# Patient Record
Sex: FEMALE | Race: WHITE | NOT HISPANIC OR LATINO | Employment: FULL TIME | URBAN - METROPOLITAN AREA
[De-identification: names, ages, dates, MRNs, and addresses within clinical notes are randomized per-mention and may not be internally consistent; named-entity substitution may affect disease eponyms.]

---

## 2017-03-29 ENCOUNTER — LAB VISIT (OUTPATIENT)
Dept: LAB | Facility: HOSPITAL | Age: 27
End: 2017-03-29
Attending: NURSE PRACTITIONER
Payer: COMMERCIAL

## 2017-03-29 ENCOUNTER — OFFICE VISIT (OUTPATIENT)
Dept: FAMILY MEDICINE | Facility: CLINIC | Age: 27
End: 2017-03-29
Payer: COMMERCIAL

## 2017-03-29 VITALS
DIASTOLIC BLOOD PRESSURE: 78 MMHG | SYSTOLIC BLOOD PRESSURE: 124 MMHG | BODY MASS INDEX: 34.51 KG/M2 | WEIGHT: 233 LBS | OXYGEN SATURATION: 99 % | HEART RATE: 85 BPM | HEIGHT: 69 IN

## 2017-03-29 DIAGNOSIS — R21 RASH: Primary | ICD-10-CM

## 2017-03-29 DIAGNOSIS — R21 RASH: ICD-10-CM

## 2017-03-29 LAB
BASOPHILS # BLD AUTO: 0.04 K/UL
BASOPHILS NFR BLD: 0.3 %
DIFFERENTIAL METHOD: NORMAL
EOSINOPHIL # BLD AUTO: 0.4 K/UL
EOSINOPHIL NFR BLD: 3.2 %
ERYTHROCYTE [DISTWIDTH] IN BLOOD BY AUTOMATED COUNT: 13.4 %
ERYTHROCYTE [SEDIMENTATION RATE] IN BLOOD BY WESTERGREN METHOD: 15 MM/HR
HCT VFR BLD AUTO: 40.5 %
HGB BLD-MCNC: 13.6 G/DL
LYMPHOCYTES # BLD AUTO: 3.5 K/UL
LYMPHOCYTES NFR BLD: 30.3 %
MCH RBC QN AUTO: 28.6 PG
MCHC RBC AUTO-ENTMCNC: 33.6 %
MCV RBC AUTO: 85 FL
MONOCYTES # BLD AUTO: 0.6 K/UL
MONOCYTES NFR BLD: 5.2 %
NEUTROPHILS # BLD AUTO: 7 K/UL
NEUTROPHILS NFR BLD: 60.8 %
PLATELET # BLD AUTO: 331 K/UL
PMV BLD AUTO: 11.2 FL
RBC # BLD AUTO: 4.76 M/UL
WBC # BLD AUTO: 11.52 K/UL

## 2017-03-29 PROCEDURE — 99999 PR PBB SHADOW E&M-NEW PATIENT-LVL III: CPT | Mod: PBBFAC,,, | Performed by: NURSE PRACTITIONER

## 2017-03-29 PROCEDURE — 36415 COLL VENOUS BLD VENIPUNCTURE: CPT | Mod: PO

## 2017-03-29 PROCEDURE — 1160F RVW MEDS BY RX/DR IN RCRD: CPT | Mod: S$GLB,,, | Performed by: NURSE PRACTITIONER

## 2017-03-29 PROCEDURE — 85025 COMPLETE CBC W/AUTO DIFF WBC: CPT

## 2017-03-29 PROCEDURE — 99203 OFFICE O/P NEW LOW 30 MIN: CPT | Mod: S$GLB,,, | Performed by: NURSE PRACTITIONER

## 2017-03-29 PROCEDURE — 85651 RBC SED RATE NONAUTOMATED: CPT | Mod: PO

## 2017-03-29 PROCEDURE — 84443 ASSAY THYROID STIM HORMONE: CPT

## 2017-03-29 PROCEDURE — 86038 ANTINUCLEAR ANTIBODIES: CPT

## 2017-03-29 PROCEDURE — 80053 COMPREHEN METABOLIC PANEL: CPT

## 2017-03-29 RX ORDER — CETIRIZINE HYDROCHLORIDE 10 MG/1
10 TABLET ORAL 2 TIMES DAILY
Qty: 20 TABLET | Refills: 0 | Status: SHIPPED | OUTPATIENT
Start: 2017-03-29 | End: 2018-09-27

## 2017-03-29 RX ORDER — METHYLPREDNISOLONE 4 MG/1
TABLET ORAL
Qty: 1 PACKAGE | Refills: 0 | Status: SHIPPED | OUTPATIENT
Start: 2017-03-29 | End: 2017-04-19

## 2017-03-29 NOTE — PROGRESS NOTES
Subjective:       Patient ID: Alka Mckeon is a 26 y.o. female.    Chief Complaint: Rash    HPI Comments: Skin on face and left hand was tinging on Friday, then Saturday noticed redness in the shonda that were tingling, Sunday rash got worse took benadryl and used benadryl ointment on the rash, Monday geto worse and tried to go to a doctor but couldn't be seen, yesterday the rash spread to stomach and right arm, went to ER at [facility] last night, she was not given anything and advised to see a dermatologist, no testing done. Itching, red, inflamed. Heat makes it much worse. No unusual activities on Thursday or Friday, did not change detergent, soap, lotion, or make up, did not eat anything unusual, no new pets in the house, has not been in yard or woods. No chemical exposure that she knows of. No improvement with benadryl cream or oral benadryl. Patient says she had a similar rash about 1.5 years ago, did not improve with a steroid shot or ointment, lasted a month and then resolved.  Lipid Panel due on 1989  TETANUS VACCINE due on 08/24/2007  Pap Smear due on 08/24/2010  Influenza Vaccine due on 08/01/2016    Past Medical History:  No past medical history on file.  No past surgical history on file.  Review of patient's allergies indicates:  No Known Allergies  No current outpatient prescriptions on file prior to visit.  No current facility-administered medications on file prior to visit.     Social History    Marital status:              Spouse name:                       Years of education:                 Number of children:               Occupational History    None on file    Social History Main Topics    Smoking status: Never Smoker                                                                   Smokeless status: Not on file                       Alcohol use: Not on file     Drug use: Not on file     Sexual activity: Not on file          Other Topics            Concern    None on  file    Social History Narrative    None on file      No family history on file.          Review of Systems   Constitutional: Negative.  Negative for chills, diaphoresis, fatigue and fever.   HENT: Negative.  Negative for facial swelling, hearing loss, mouth sores, nosebleeds, postnasal drip, rhinorrhea, sinus pressure, sneezing, sore throat and trouble swallowing.    Respiratory: Negative.  Negative for cough, shortness of breath and wheezing.    Cardiovascular: Negative.  Negative for chest pain, palpitations and leg swelling.   Gastrointestinal: Negative.  Negative for abdominal pain, constipation, diarrhea, nausea and vomiting.   Genitourinary: Negative.    Musculoskeletal: Negative.  Negative for arthralgias.   Skin: Positive for rash.   Neurological: Negative.  Negative for dizziness, light-headedness and headaches.       Objective:      Physical Exam   Constitutional: She is oriented to person, place, and time. No distress.   HENT:   Head: Normocephalic and atraumatic.   Cardiovascular: Normal rate.    Pulmonary/Chest: Effort normal.   Neurological: She is alert and oriented to person, place, and time.   Skin: Rash noted. She is not diaphoretic. No erythema. No pallor.        Psychiatric: She has a normal mood and affect. Her behavior is normal.       Assessment:       1. Rash        Plan:       1. Rash  Labs today, treat with medrol, zantac and zyrtec. Dermatology scheduled next week to evaluate.   - CBC auto differential; Future  - TSH; Future  - KRYS; Future  - Comprehensive metabolic panel; Future  - methylPREDNISolone (MEDROL DOSEPACK) 4 mg tablet; use as directed  Dispense: 1 Package; Refill: 0  - ranitidine (ZANTAC) 150 MG tablet; Take 1 tablet (150 mg total) by mouth 2 (two) times daily.  Dispense: 20 tablet; Refill: 0  - cetirizine (ZYRTEC) 10 MG tablet; Take 1 tablet (10 mg total) by mouth 2 (two) times daily.  Dispense: 20 tablet; Refill: 0  - Sedimentation rate, manual; Future,ed

## 2017-03-29 NOTE — MR AVS SNAPSHOT
[facility]  [address]  [facility] [address]  Phone: [phone]  Fax: [phone]                  Alka Mckeon   3/29/2017 2:40 PM   Office Visit    Description:  Female : 1990   Provider:  Esther Harrell NP   Department:  [facility]           Reason for Visit     Rash           Diagnoses this Visit        Comments    Rash    -  Primary            To Do List           Future Appointments        Provider Department Dept Phone    3/29/2017 4:45 PM LAB, [place] [facility] [phone]      Goals (5 Years of Data)     None       These Medications        Disp Refills Start End    methylPREDNISolone (MEDROL DOSEPACK) 4 mg tablet 1 Package 0 3/29/2017 2017    use as directed    Pharmacy: [facility] [address] [place] [address] [facility] AT [facility] [address] [place] [address] Ph #: [phone]       ranitidine (ZANTAC) 150 MG tablet 20 tablet 0 3/29/2017 3/29/2018    Take 1 tablet (150 mg total) by mouth 2 (two) times daily. - Oral    Pharmacy: [facility] [address] [place] [address] [facility] AT [facility] [address] [facility] [address] Ph #: [phone]       cetirizine (ZYRTEC) 10 MG tablet 20 tablet 0 3/29/2017 3/29/2018    Take 1 tablet (10 mg total) by mouth 2 (two) times daily. - Oral    Pharmacy: [facility] [address] [place] [address] [facility] AT [facility] [address] [place] [address] Ph #: [phone]         [facility] On Call     [facility] On Call Nurse Care Line -  Assistance  Registered nurses in the [facility] provide clinical advisement, health education, appointment booking, and other advisory services.  Call for this free service at [phone].             Medications           Message regarding Medications     Verify the changes and/or additions to your medication regime listed below are the same as discussed with your clinician today.  If any of these changes or additions are  "incorrect, please notify your healthcare provider.        START taking these NEW medications        Refills    methylPREDNISolone (MEDROL DOSEPACK) 4 mg tablet 0    Sig: use as directed    Class: Normal    ranitidine (ZANTAC) 150 MG tablet 0    Sig: Take 1 tablet (150 mg total) by mouth 2 (two) times daily.    Class: Normal    Route: Oral    cetirizine (ZYRTEC) 10 MG tablet 0    Sig: Take 1 tablet (10 mg total) by mouth 2 (two) times daily.    Class: Normal    Route: Oral           Verify that the below list of medications is an accurate representation of the medications you are currently taking.  If none reported, the list may be blank. If incorrect, please contact your healthcare provider. Carry this list with you in case of emergency.           Current Medications     PRENATAL VIT COMB.10-IRON-FA ORAL Take by mouth.    cetirizine (ZYRTEC) 10 MG tablet Take 1 tablet (10 mg total) by mouth 2 (two) times daily.    methylPREDNISolone (MEDROL DOSEPACK) 4 mg tablet use as directed    ranitidine (ZANTAC) 150 MG tablet Take 1 tablet (150 mg total) by mouth 2 (two) times daily.           Clinical Reference Information           Your Vitals Were     BP Pulse Height Weight Last Period SpO2    124/78 85 5' 9" (1.753 m) 105.7 kg (233 lb 0.4 oz) 03/08/2017 (Approximate) 99%    BMI                34.41 kg/m2          Blood Pressure          Most Recent Value    BP  124/78      Allergies as of 3/29/2017     No Known Allergies      Immunizations Administered on Date of Encounter - 3/29/2017     None      Orders Placed During Today's Visit     Future Labs/Procedures Expected by Expires    KRYS  3/29/2017 5/28/2018    CBC auto differential  3/29/2017 5/28/2018    Comprehensive metabolic panel  3/29/2017 5/28/2018    Sedimentation rate, manual  3/29/2017 5/28/2018    TSH  3/29/2017 5/28/2018      [facility] Sign-Up     Activating your [facility] account is as easy as 1-2-3!     1) Visit [email or web address], select Sign Up Now, enter this " activation code and your date of birth, then select Next.  [email or web address]  Expires: 5/13/2017  3:44 PM      2) Create a username and password to use when you visit [facility] in the future and select a security question in case you lose your password and select Next.    3) Enter your e-mail address and click Sign Up!    Additional Information  If you have questions, please e-mail [email or web address] or call [phone] to talk to our [employer] staff. Remember, [employer] is NOT to be used for urgent needs. For medical emergencies, dial 911.         Language Assistance Services     ATTENTION: Language assistance services are available, free of charge. Please call [phone].      ATENCIÓN: Si habla colton, tiene a ware disposición servicios gratuitos de asistencia lingüística. Llame al [phone].     CHÚ Ý: N?u b?n nói Ti?ng Vi?t, có các d?ch v? h? tr? ngôn ng? mi?n phí dành cho b?n. G?i s? [phone].         [facility] complies with applicable Federal civil rights laws and does not discriminate on the basis of race, color, national origin, age, disability, or sex.

## 2017-03-30 LAB
ALBUMIN SERPL BCP-MCNC: 3.8 G/DL
ALP SERPL-CCNC: 76 U/L
ALT SERPL W/O P-5'-P-CCNC: 61 U/L
ANA SER QL IF: NORMAL
ANION GAP SERPL CALC-SCNC: 9 MMOL/L
AST SERPL-CCNC: 40 U/L
BILIRUB SERPL-MCNC: 0.3 MG/DL
BUN SERPL-MCNC: 9 MG/DL
CALCIUM SERPL-MCNC: 9.3 MG/DL
CHLORIDE SERPL-SCNC: 104 MMOL/L
CO2 SERPL-SCNC: 28 MMOL/L
CREAT SERPL-MCNC: 0.8 MG/DL
EST. GFR  (AFRICAN AMERICAN): >60 ML/MIN/1.73 M^2
EST. GFR  (NON AFRICAN AMERICAN): >60 ML/MIN/1.73 M^2
GLUCOSE SERPL-MCNC: 100 MG/DL
POTASSIUM SERPL-SCNC: 4.4 MMOL/L
PROT SERPL-MCNC: 7.2 G/DL
SODIUM SERPL-SCNC: 141 MMOL/L
TSH SERPL DL<=0.005 MIU/L-ACNC: 2.41 UIU/ML

## 2017-03-31 ENCOUNTER — TELEPHONE (OUTPATIENT)
Dept: FAMILY MEDICINE | Facility: CLINIC | Age: 27
End: 2017-03-31

## 2017-03-31 DIAGNOSIS — R74.01 ELEVATED ALT MEASUREMENT: Primary | ICD-10-CM

## 2017-03-31 NOTE — TELEPHONE ENCOUNTER
----- Message from Esther Harrell NP sent at 3/31/2017  9:56 AM CDT -----  KRYS negative. Thyroid normal. CMP- one liver enzyme mildly elevated, otherwise normal, recheck liver panel in 2-3 weeks. CBC normal. Sed rate normal.

## 2017-03-31 NOTE — TELEPHONE ENCOUNTER
Phoned pt in regards to lab results per Esther Harrell's instructions. Pt voiced understanding and upon attempting to schedule repeat liver function panel pt states she will call back to schedule. CLC

## 2018-09-27 ENCOUNTER — OFFICE VISIT (OUTPATIENT)
Dept: FAMILY MEDICINE | Facility: CLINIC | Age: 28
End: 2018-09-27
Payer: COMMERCIAL

## 2018-09-27 VITALS
TEMPERATURE: 98 F | OXYGEN SATURATION: 98 % | HEIGHT: 69 IN | HEART RATE: 78 BPM | BODY MASS INDEX: 33.86 KG/M2 | RESPIRATION RATE: 20 BRPM | DIASTOLIC BLOOD PRESSURE: 80 MMHG | WEIGHT: 228.63 LBS | SYSTOLIC BLOOD PRESSURE: 116 MMHG

## 2018-09-27 DIAGNOSIS — H65.01 ACUTE SEROUS OTITIS MEDIA WITHOUT RUPTURE, RIGHT: Primary | ICD-10-CM

## 2018-09-27 PROCEDURE — 99214 OFFICE O/P EST MOD 30 MIN: CPT | Mod: S$GLB,,, | Performed by: PHYSICIAN ASSISTANT

## 2018-09-27 PROCEDURE — 3008F BODY MASS INDEX DOCD: CPT | Mod: CPTII,S$GLB,, | Performed by: PHYSICIAN ASSISTANT

## 2018-09-27 PROCEDURE — 99999 PR PBB SHADOW E&M-EST. PATIENT-LVL IV: CPT | Mod: PBBFAC,,, | Performed by: PHYSICIAN ASSISTANT

## 2018-09-27 RX ORDER — PREDNISONE 20 MG/1
40 TABLET ORAL DAILY
Qty: 10 TABLET | Refills: 0 | Status: SHIPPED | OUTPATIENT
Start: 2018-09-27 | End: 2018-10-02

## 2018-09-27 RX ORDER — MINERAL OIL
180 ENEMA (ML) RECTAL DAILY
COMMUNITY
End: 2024-01-05 | Stop reason: CLARIF

## 2018-09-27 NOTE — LETTER
September 27, 2018      [facility]  [address] [place] [address]  Phone: [phone]  Fax: [phone]       Patient: Alka Mckeon   YOB: 1990  Date of Visit: 09/27/2018    To Whom It May Concern:    Alison Mckeon  was at [facility] on 09/27/2018. She may return to work on 9/29/2018 with no restrictions. If you have any questions or concerns, or if I can be of further assistance, please do not hesitate to contact me.    Sincerely,    Aster Cates PA-C

## 2018-09-27 NOTE — PROGRESS NOTES
Subjective:       Patient ID: Alka Mckeon is a 28 y.o. female    Chief Complaint: Cough    HPI  Mrs Mckeon presents today CO sinus congestion, cough and ear pressure that began 2 days ago.   Her  got sick yesterday with similar symptoms.  She has started taking allegra without any relief if her symptoms.     Review of Systems   Constitutional: Negative for chills and fever.   HENT: Positive for congestion, ear pain and postnasal drip.    Eyes: Negative for visual disturbance.   Respiratory: Positive for cough.    Cardiovascular: Negative for chest pain.   Gastrointestinal: Negative for abdominal pain, diarrhea, nausea and vomiting.   Skin: Negative for rash.   Neurological: Negative for headaches.        Objective:   Physical Exam   Constitutional: She is oriented to person, place, and time. She appears well-developed and well-nourished.   HENT:   Head: Normocephalic and atraumatic.   Nose: Nose normal.   Mouth/Throat: No oropharyngeal exudate.   Right TM shows clear effusion   Eyes: Conjunctivae and EOM are normal. Pupils are equal, round, and reactive to light.   Neck: Normal range of motion. Neck supple. No thyromegaly present.   Cardiovascular: Normal rate, regular rhythm, normal heart sounds and intact distal pulses. Exam reveals no gallop and no friction rub.   No murmur heard.  Pulmonary/Chest: Effort normal and breath sounds normal. No respiratory distress. She has no wheezes. She has no rales.   Musculoskeletal: Normal range of motion. She exhibits no edema.   Lymphadenopathy:     She has no cervical adenopathy.   Neurological: She is alert and oriented to person, place, and time. She has normal reflexes.   Skin: Skin is warm and dry. No rash noted.   Psychiatric: She has a normal mood and affect. Her behavior is normal. Judgment and thought content normal.        Assessment:       1. Acute serous otitis media without rupture, right  predniSONE (DELTASONE) 20 MG tablet        Plan:        Acute serous otitis media without rupture, right  -     predniSONE (DELTASONE) 20 MG tablet; Take 2 tablets (40 mg total) by mouth once daily. for 5 days  Dispense: 10 tablet; Refill: 0

## 2020-06-09 ENCOUNTER — OFFICE VISIT (OUTPATIENT)
Dept: FAMILY MEDICINE | Facility: CLINIC | Age: 30
End: 2020-06-09
Payer: COMMERCIAL

## 2020-06-09 VITALS
HEART RATE: 97 BPM | TEMPERATURE: 98 F | BODY MASS INDEX: 35.07 KG/M2 | HEIGHT: 69 IN | OXYGEN SATURATION: 95 % | DIASTOLIC BLOOD PRESSURE: 82 MMHG | SYSTOLIC BLOOD PRESSURE: 120 MMHG | WEIGHT: 236.75 LBS

## 2020-06-09 DIAGNOSIS — Z3A.01 LESS THAN 8 WEEKS GESTATION OF PREGNANCY: ICD-10-CM

## 2020-06-09 DIAGNOSIS — L25.9 CONTACT DERMATITIS, UNSPECIFIED CONTACT DERMATITIS TYPE, UNSPECIFIED TRIGGER: Primary | ICD-10-CM

## 2020-06-09 PROCEDURE — 99214 PR OFFICE/OUTPT VISIT, EST, LEVL IV, 30-39 MIN: ICD-10-PCS | Mod: S$GLB,,, | Performed by: NURSE PRACTITIONER

## 2020-06-09 PROCEDURE — 99999 PR PBB SHADOW E&M-EST. PATIENT-LVL IV: CPT | Mod: PBBFAC,,, | Performed by: NURSE PRACTITIONER

## 2020-06-09 PROCEDURE — 3008F BODY MASS INDEX DOCD: CPT | Mod: CPTII,S$GLB,, | Performed by: NURSE PRACTITIONER

## 2020-06-09 PROCEDURE — 3008F PR BODY MASS INDEX (BMI) DOCUMENTED: ICD-10-PCS | Mod: CPTII,S$GLB,, | Performed by: NURSE PRACTITIONER

## 2020-06-09 PROCEDURE — 99214 OFFICE O/P EST MOD 30 MIN: CPT | Mod: S$GLB,,, | Performed by: NURSE PRACTITIONER

## 2020-06-09 PROCEDURE — 99999 PR PBB SHADOW E&M-EST. PATIENT-LVL IV: ICD-10-PCS | Mod: PBBFAC,,, | Performed by: NURSE PRACTITIONER

## 2020-06-09 NOTE — PATIENT INSTRUCTIONS
"  Contact Dermatitis  Contact dermatitis is a skin rash caused by something that touches the skin and makes it irritated and inflamed. Your skin may be red, swollen, dry, and may be cracked. Blisters may form and ooze. The rash will itch.  Contact dermatitis can form on the face and neck, backs of hands, forearms, genitals, and lower legs.  People can get contact dermatitis from lots of sources. These include:  · Plants such as poison ivy, oak, or sumac  · Chemicals in hair dyes and rinses, soaps, solvents, waxes, fingernail polish, and deodorants   · Jewelry or watchbands made of nickel  Contact dermatitis is not passed from person to person.  Talk with your healthcare provider about what may have caused the rash. A type of allergy testing called "patch testing" may be used to discover what you are allergic to. You will need to avoid the source of your rash in the future to prevent it from coming back.  Treatment is done to relieve itching and prevent the rash from coming back. The rash should go away in a few days to a few weeks.  Home care  Your healthcare provider may prescribe medicine to relieve swelling and itching. Follow all instructions when using these medicines.  General care:  · Avoid anything that heats up your skin, such as hot showers or baths, or direct sunlight. This can make itching worse.  · Apply cold compresses to soothe your sores to help relieve your symptoms. Do this for 30 minutes 3 to 4 times a day. You can make a cold compress by soaking a cloth in cold water. Squeeze out excess water. You can add colloidal oatmeal to the water to help reduce itching. For severe itching in a small area, apply an ice pack wrapped in a thin towel. Do this for 20 minutes 3 to 4 times a day.  · You can also try wet dressings. One way to do this is to wear a wet piece of clothing under a dry one. Wear a damp shirt under a dry shirt if your upper body is affected. This can relieve itching and prevent you from " scratching the affected area.  · You can also help relieve large areas of itching by taking a lukewarm bath with colloidal oatmeal added to the water.  · Use hydrocortisone cream for redness and irritation, unless another medicine was prescribed. You can also use benzocaine anesthetic cream or spray. Calamine lotion can also relieve mild symptoms.  · Use oral diphenhydramine to help reduce itching. You can buy this antihistamine at drug and grocery stores. It can make you sleepy, so use lower doses during the daytime. Or you can use loratadine. This is an antihistamine that will not make you sleepy. Do not use diphenhydramine if you have glaucoma or have trouble urinating due to an enlarged prostate.  · If a plant causes your rash, make sure to wash your skin and the clothes you were wearing when you came into contact with the plant. This is to wash away the plant oils that gave you the rash and prevent more or worse symptoms.  · Stay away from the substance or object that causes your symptoms. If you cant avoid it, wear gloves or some other type of protection.  Follow-up care  Follow up with your healthcare provider, or as advised.  When to seek medical advice  Call your healthcare provider right away if any of these occur:  · Spreading of the rash to other parts of your body  · Severe swelling of your face, eyelids, mouth, throat or tongue  · Trouble urinating due to swelling in the genital area  · Fever of 100.4°F (38°C) or higher  · Redness or swelling that gets worse  · Pain that gets worse  · Foul-smelling fluid leaking from the skin  · Yellow-brown crusts on the open blisters  Date Last Reviewed: 9/1/2016  © 3533-9780 [employer]. [address] [place] [address], [place] [address]. All rights reserved. This information is not intended as a substitute for professional medical care. Always follow your healthcare professional's instructions.

## 2020-06-09 NOTE — PROGRESS NOTES
Subjective:       Patient ID: Alka Mckeon is a 29 y.o. female.    Chief Complaint: Rash (abd/breast...noted about 1 week ago)    Patient who is new to me presents for rash. She was putting on diane butter and noticed a rash. She is 7 weeks pregnant.     Rash   This is a new problem. The current episode started in the past 7 days. The problem is unchanged. The affected locations include the abdomen. The rash is characterized by redness, swelling and itchiness. She was exposed to a new detergent/soap. Pertinent negatives include no congestion, cough, diarrhea, fatigue, fever, shortness of breath, sore throat or vomiting. Past treatments include anti-itch cream. The treatment provided mild relief.     Review of Systems   Constitutional: Negative for chills, fatigue and fever.   HENT: Negative for congestion, sinus pressure, sinus pain, sneezing and sore throat.    Respiratory: Negative for cough, chest tightness, shortness of breath and wheezing.    Cardiovascular: Negative for chest pain, palpitations and leg swelling.   Gastrointestinal: Negative for abdominal distention, abdominal pain, constipation, diarrhea, nausea and vomiting.   Genitourinary: Negative for decreased urine volume, difficulty urinating, dysuria, frequency and urgency.   Musculoskeletal: Negative for arthralgias, gait problem, joint swelling and myalgias.   Skin: Positive for rash. Negative for wound.   Neurological: Negative for dizziness, light-headedness, numbness and headaches.       Objective:      Physical Exam   Constitutional: She is oriented to person, place, and time. She appears well-developed and well-nourished.   HENT:   Head: Normocephalic and atraumatic.   Right Ear: External ear normal.   Left Ear: External ear normal.   Nose: Nose normal.   Mouth/Throat: Oropharynx is clear and moist.   Eyes: Pupils are equal, round, and reactive to light.   Neck: Normal range of motion.   Cardiovascular: Normal rate, regular rhythm, normal  heart sounds and intact distal pulses.   Pulmonary/Chest: Effort normal and breath sounds normal.   Abdominal: Soft. Bowel sounds are normal.   Musculoskeletal: Normal range of motion.   Neurological: She is alert and oriented to person, place, and time.   Skin: Skin is warm and dry. Rash noted.        Nursing note and vitals reviewed.          Assessment:       1. Contact dermatitis, unspecified contact dermatitis type, unspecified trigger    2. Less than 8 weeks gestation of pregnancy        Plan:       Alka was seen today for rash.    Diagnoses and all orders for this visit:    Contact dermatitis, unspecified contact dermatitis type, unspecified trigger    Less than 8 weeks gestation of pregnancy      Advised benadryl, hydrocortisone cream, and calamine lotion. Notify GYN before using any of the recommended medications. Discussed worsening signs/symptoms and when to return to clinic or go to ED.   Patient expresses understanding and agrees with treatment plan.

## 2024-01-05 ENCOUNTER — TELEPHONE (OUTPATIENT)
Dept: NEUROLOGY | Facility: CLINIC | Age: 34
End: 2024-01-05
Payer: COMMERCIAL

## 2024-01-05 NOTE — TELEPHONE ENCOUNTER
Spoke to patient and let her know that none of the providers see memory patients under 45 and without a referral . Told the patient we could see her for tingling and numbness but she would need a referral from her primary care. Patient did not want to go to primary care and asked if anyone else would see her .so  I recommended [facility] in [place].

## 2025-03-13 ENCOUNTER — OFFICE VISIT (OUTPATIENT)
Dept: FAMILY MEDICINE | Facility: CLINIC | Age: 35
End: 2025-03-13
Payer: COMMERCIAL

## 2025-03-13 VITALS
BODY MASS INDEX: 35.98 KG/M2 | TEMPERATURE: 99 F | OXYGEN SATURATION: 98 % | SYSTOLIC BLOOD PRESSURE: 130 MMHG | DIASTOLIC BLOOD PRESSURE: 76 MMHG | HEART RATE: 91 BPM | WEIGHT: 242.94 LBS | HEIGHT: 69 IN

## 2025-03-13 DIAGNOSIS — Z76.89 ENCOUNTER TO ESTABLISH CARE: ICD-10-CM

## 2025-03-13 DIAGNOSIS — Z13.1 DIABETES MELLITUS SCREENING: ICD-10-CM

## 2025-03-13 DIAGNOSIS — Z13.220 LIPID SCREENING: ICD-10-CM

## 2025-03-13 DIAGNOSIS — Z86.39 HISTORY OF THYROID DISEASE: ICD-10-CM

## 2025-03-13 DIAGNOSIS — N64.4 ACUTE BREAST PAIN: ICD-10-CM

## 2025-03-13 DIAGNOSIS — Z00.00 ANNUAL PHYSICAL EXAM: Primary | ICD-10-CM

## 2025-03-13 PROBLEM — Z90.49 HISTORY OF LAPAROSCOPIC CHOLECYSTECTOMY: Status: ACTIVE | Noted: 2025-03-13

## 2025-03-13 PROCEDURE — 99999 PR PBB SHADOW E&M-EST. PATIENT-LVL III: CPT | Mod: PBBFAC,,, | Performed by: STUDENT IN AN ORGANIZED HEALTH CARE EDUCATION/TRAINING PROGRAM

## 2025-03-13 NOTE — PROGRESS NOTES
"Chief Complaint   Patient presents with    Establish Care       Subjective   Patient ID: Alka Mckeon is a 34 y.o. female.    HPI  Akla Mckeon is a 34 y.o. with a PMHx hypothyroidism (not on medication) who presents today to establish care with provider and annual physical   The patients reports she has never had a PCP and was only seen by her OBGYN    Concerns today:   1) she reports that she was dx with hypothyroidism years ago but never started medication for it  2)bilateral breast pain which started a couple of months ago but has returned 2-3 days ago  -when occurred a [place] of months ago it lasted a week but at that time she had a "chest infection and was coughing"  -acne pain which is 5-6, which is constant, its her whole breast that is tender not just her nipples     Current medication list: none  Allergies: NKDA   PSHx: cholecystectomy   PFHx: mother - hypertension, diabetes; father - hypertension; MGM - diabetes; PGF - colon cancer     Social  Tobacco use: reports no use or hx  Alcohol use: reports no use  Illicit drug use: reports no use   Lives with:  and 2 sons  Employment/Work: caregiver  Any children: 2    Lifestyle  Current exercise regimen: no structured routine   Current diet: micture but mostly at home     OB/GYN Hx   LMP: 2024  GsPs:   Contraception: IUD (Jennifer)   Cervical cancer screening - Last PAP: 10/20/2023 (NSIL and HPV negative)  Breast cancer screening - Last mammogram: never had one, fam hx: denies    Screening Hx:  Dental care: 2025  Annual eye exam: 8 mo ago    IPV screen: she reports feeling safe in her relationship   PHQ9 screen:       3/13/2025   PHQ-9 Depression Patient Health Questionnaire   Over the last two weeks how often have you been bothered by little interest or pleasure in doing things 0   Over the last two weeks how often have you been bothered by feeling down, depressed or hopeless 0        Colorectal cancer screening - Last colonoscopy: " "NA  - Starting age 45 in high risk populations. pt average risk  Lung cancer screening - LDCT: NA   - Risk factors: age 50 to 80 who have 20-pack year smoking hx and currently smoke or have quit within the last 15 years  DEXA - Last scan: NA   - A 65, or younger if risk factors. Use FRAX or SCORE  AAA screening - Last US:NA    - Male age 65-75 with hx of smoking or family hx      Review of Systems  Objective      Body mass index is 35.88 kg/m².  Vitals:    03/13/25 0912   BP: 130/76   Pulse: 91   Temp: 98.9 °F (37.2 °C)   TempSrc: Oral   SpO2: 98%   Weight: 110.2 kg (242 lb 15.2 oz)   Height: 5' 9" (1.753 m)          Physical Exam  Vitals reviewed. Exam conducted with a chaperone present.   Constitutional:       General: She is not in acute distress.     Appearance: Normal appearance. She is not ill-appearing.   HENT:      Head: Normocephalic and atraumatic.      Right Ear: Tympanic membrane, ear canal and external ear normal. There is no impacted cerumen.      Left Ear: Tympanic membrane, ear canal and external ear normal. There is no impacted cerumen.      Mouth/Throat:      Mouth: Mucous membranes are moist.      Pharynx: No oropharyngeal exudate.   Eyes:      Conjunctiva/sclera: Conjunctivae normal.   Cardiovascular:      Rate and Rhythm: Normal rate and regular rhythm.      Pulses: Normal pulses.   Pulmonary:      Effort: Pulmonary effort is normal. No respiratory distress.      Breath sounds: Normal breath sounds. No wheezing.   Chest:   Breasts:     Right: Tenderness (upper outer quadarant) present. No inverted nipple or nipple discharge.      Left: Tenderness (lower outer quadarant) present. No swelling, inverted nipple or nipple discharge.   Abdominal:      General: There is no distension.      Palpations: Abdomen is soft.      Tenderness: There is no abdominal tenderness.   Musculoskeletal:      Cervical back: Neck supple.      Right lower leg: No edema.      Left lower leg: No edema.   Lymphadenopathy:    "   Upper Body:      Right upper body: No supraclavicular, axillary or pectoral adenopathy.      Left upper body: No supraclavicular, axillary or pectoral adenopathy.   Neurological:      Mental Status: She is alert and oriented to person, place, and time.         Procedures         Assessment & Plan   Annual physical exam  - Patient is hemodynamically stable with no acute findings on physical exam.  -     CBC Without Differential; Future; Expected date: 03/13/2025  -     Basic Metabolic Panel; Future; Expected date: 03/13/2025    Encounter to establish care  The patient reports that she has never had a PCP as an adult - only sees OBGYN    Acute breast pain  - She reports bilateral breast pain for 3 days - she had previous episode in 12/2024 which lasted for 1wk  -  On exam pain in right upper outer and left lower outer quadrants with palpation  - Will order PT US   -     US Breast Bilateral Limited; Future; Expected date: 03/13/2025    Lipid screening  -     Hemoglobin A1C; Future; Expected date: 03/13/2025    Diabetes mellitus screening  -     Lipid Panel; Future; Expected date: 03/13/2025    History of thyroid disease  - The patient reports hypothyroidism for which she is not on medication (she has never been on medication). Per chart review it appears she had subclinical hypothyroidism with no evidence of every having abnormal T4/T3 results from 2009.  -     TSH; Future; Expected date: 03/13/2025      Patient informed of transferring medical history from prior provider to [facility].

## 2025-03-20 ENCOUNTER — OFFICE VISIT (OUTPATIENT)
Dept: OTOLARYNGOLOGY | Facility: CLINIC | Age: 35
End: 2025-03-20
Payer: COMMERCIAL

## 2025-03-20 ENCOUNTER — LAB VISIT (OUTPATIENT)
Dept: LAB | Facility: HOSPITAL | Age: 35
End: 2025-03-20
Attending: STUDENT IN AN ORGANIZED HEALTH CARE EDUCATION/TRAINING PROGRAM
Payer: COMMERCIAL

## 2025-03-20 VITALS — BODY MASS INDEX: 36.15 KG/M2 | HEIGHT: 69 IN | WEIGHT: 244.06 LBS

## 2025-03-20 DIAGNOSIS — Z86.39 HISTORY OF THYROID DISEASE: ICD-10-CM

## 2025-03-20 DIAGNOSIS — J35.01 CHRONIC TONSILLITIS: ICD-10-CM

## 2025-03-20 DIAGNOSIS — R19.6 HALITOSIS: ICD-10-CM

## 2025-03-20 DIAGNOSIS — J35.8 TONSILLITH: Primary | ICD-10-CM

## 2025-03-20 DIAGNOSIS — Z00.00 ANNUAL PHYSICAL EXAM: ICD-10-CM

## 2025-03-20 DIAGNOSIS — Z13.1 DIABETES MELLITUS SCREENING: ICD-10-CM

## 2025-03-20 DIAGNOSIS — Z13.220 LIPID SCREENING: ICD-10-CM

## 2025-03-20 LAB
ANION GAP SERPL CALC-SCNC: 11 MMOL/L (ref 8–16)
BUN SERPL-MCNC: 8 MG/DL (ref 6–20)
CALCIUM SERPL-MCNC: 8.9 MG/DL (ref 8.7–10.5)
CHLORIDE SERPL-SCNC: 107 MMOL/L (ref 95–110)
CHOLEST SERPL-MCNC: 184 MG/DL (ref 120–199)
CHOLEST/HDLC SERPL: 5.8 {RATIO} (ref 2–5)
CO2 SERPL-SCNC: 22 MMOL/L (ref 23–29)
CREAT SERPL-MCNC: 0.7 MG/DL (ref 0.5–1.4)
ERYTHROCYTE [DISTWIDTH] IN BLOOD BY AUTOMATED COUNT: 13.7 % (ref 11.5–14.5)
EST. GFR  (NO RACE VARIABLE): >60 ML/MIN/1.73 M^2
ESTIMATED AVG GLUCOSE: 114 MG/DL (ref 68–131)
GLUCOSE SERPL-MCNC: 86 MG/DL (ref 70–110)
HBA1C MFR BLD: 5.6 % (ref 4–5.6)
HCT VFR BLD AUTO: 43.8 % (ref 37–48.5)
HDLC SERPL-MCNC: 32 MG/DL (ref 40–75)
HDLC SERPL: 17.4 % (ref 20–50)
HGB BLD-MCNC: 14 G/DL (ref 12–16)
LDLC SERPL CALC-MCNC: 126.2 MG/DL (ref 63–159)
MCH RBC QN AUTO: 27.5 PG (ref 27–31)
MCHC RBC AUTO-ENTMCNC: 32 G/DL (ref 32–36)
MCV RBC AUTO: 86 FL (ref 82–98)
NONHDLC SERPL-MCNC: 152 MG/DL
PLATELET # BLD AUTO: 358 K/UL (ref 150–450)
PMV BLD AUTO: 11.1 FL (ref 9.2–12.9)
POTASSIUM SERPL-SCNC: 4.4 MMOL/L (ref 3.5–5.1)
RBC # BLD AUTO: 5.09 M/UL (ref 4–5.4)
SODIUM SERPL-SCNC: 140 MMOL/L (ref 136–145)
T4 FREE SERPL-MCNC: 0.85 NG/DL (ref 0.71–1.51)
TRIGL SERPL-MCNC: 129 MG/DL (ref 30–150)
TSH SERPL DL<=0.005 MIU/L-ACNC: 5.6 UIU/ML (ref 0.4–4)
WBC # BLD AUTO: 12.12 K/UL (ref 3.9–12.7)

## 2025-03-20 PROCEDURE — 99999 PR PBB SHADOW E&M-EST. PATIENT-LVL II: CPT | Mod: PBBFAC,,, | Performed by: STUDENT IN AN ORGANIZED HEALTH CARE EDUCATION/TRAINING PROGRAM

## 2025-03-20 PROCEDURE — 1159F MED LIST DOCD IN RCRD: CPT | Mod: CPTII,S$GLB,, | Performed by: STUDENT IN AN ORGANIZED HEALTH CARE EDUCATION/TRAINING PROGRAM

## 2025-03-20 PROCEDURE — 85027 COMPLETE CBC AUTOMATED: CPT | Performed by: STUDENT IN AN ORGANIZED HEALTH CARE EDUCATION/TRAINING PROGRAM

## 2025-03-20 PROCEDURE — 84443 ASSAY THYROID STIM HORMONE: CPT | Performed by: STUDENT IN AN ORGANIZED HEALTH CARE EDUCATION/TRAINING PROGRAM

## 2025-03-20 PROCEDURE — 84439 ASSAY OF FREE THYROXINE: CPT | Performed by: STUDENT IN AN ORGANIZED HEALTH CARE EDUCATION/TRAINING PROGRAM

## 2025-03-20 PROCEDURE — 83036 HEMOGLOBIN GLYCOSYLATED A1C: CPT | Performed by: STUDENT IN AN ORGANIZED HEALTH CARE EDUCATION/TRAINING PROGRAM

## 2025-03-20 PROCEDURE — 3008F BODY MASS INDEX DOCD: CPT | Mod: CPTII,S$GLB,, | Performed by: STUDENT IN AN ORGANIZED HEALTH CARE EDUCATION/TRAINING PROGRAM

## 2025-03-20 PROCEDURE — 99203 OFFICE O/P NEW LOW 30 MIN: CPT | Mod: S$GLB,,, | Performed by: STUDENT IN AN ORGANIZED HEALTH CARE EDUCATION/TRAINING PROGRAM

## 2025-03-20 PROCEDURE — 80048 BASIC METABOLIC PNL TOTAL CA: CPT | Performed by: STUDENT IN AN ORGANIZED HEALTH CARE EDUCATION/TRAINING PROGRAM

## 2025-03-20 PROCEDURE — 80061 LIPID PANEL: CPT | Performed by: STUDENT IN AN ORGANIZED HEALTH CARE EDUCATION/TRAINING PROGRAM

## 2025-03-20 NOTE — PROGRESS NOTES
Otolaryngology Clinic Note    Subjective:       Patient ID: Alka Mckeon is a 34 y.o. female.    Chief Complaint: Consult (Recurrent tonsil stones)      History of Present Illness: Alka Mckeon is a 34 y.o. female presenting with tonsil stones. Has had them for years. They are always there. Sometimes painful when sick. Has halitosis. She snores. No pauses, no sleep apnea concerns. No strep. No nasal complaints. No reflux complaints. Abx do not help. She has used peridex 2 x a week for a long time. Was in dentistry.       Past Surgical History:   Procedure Laterality Date    LAPAROSCOPIC CHOLECYSTECTOMY N/A 1/12/2024    Procedure: CHOLECYSTECTOMY-LAPAROSCOPIC;  Surgeon: Champ Arias MD;  Location: [facility];  Service: General;  Laterality: N/A;     Past Medical History:   Diagnosis Date    Subclinical hypothyroidism 2009    history of     Social Drivers of Health     Tobacco Use: Low Risk  (3/13/2025)    Patient History     Smoking Tobacco Use: Never     Smokeless Tobacco Use: Never     Passive Exposure: Never   Alcohol Use: Not At Risk (10/13/2023)    Received from [facility]idi[facility] and Affiliates    AUDIT-C     Frequency of Alcohol Consumption: Never     Average Number of Drinks: Patient does not drink     Frequency of Binge Drinking: Never   Financial Resource Strain: Low Risk  (10/13/2023)    Received from [facility] and Affiliates    Overall Financial Resource Strain (CARDIA)     Difficulty of Paying Living Expenses: Not hard at all   Food Insecurity: No Food Insecurity (10/13/2023)    Received from [facility] and Affiliates    Hunger Vital Sign     Worried About Running Out of Food in the Last Year: Never true     Ran Out of Food in the Last Year: Never true   Transportation Needs: No Transportation Needs (10/13/2023)     [facility]    PRAPARE - Transportation     Lack of Transportation (Medical): No     Lack of Transportation (Non-Medical): No   Physical Activity: Insufficiently Active (10/13/2023)    [facility]    Exercise Vital Sign     Days of Exercise per Week: 2 days     Minutes of Exercise per Session: 30 min   Stress: Stress Concern Present (10/13/2023)    [facility]    [employer] - Occupational Stress Questionnaire     Feeling of Stress : Rather much   Housing Stability: Unknown (10/13/2023)    Received from [facility] and [facility]iliates    Housing Stability Vital Sign     Unable to Pay for Housing in the Last Year: No     Number of Places Lived in the Last Year: Not on file     Unstable Housing in the Last Year: No   Depression: Low Risk  (3/13/2025)    Depression     Last PHQ-4: Flowsheet Data: 0   Utilities: Not At Risk (10/13/2023)    [facility]    [facility] Utilities     Threatened with loss of utilities: No   Health Literacy: Not on file   Social Isolation: Not on file     Review of patient's allergies indicates:  No Known Allergies  No current outpatient medications      ENT ROS negative except as stated above.     Patient answers are not available for this visit.            Objective:      There were no vitals filed for this visit.    General: NAD, well appearing  Eyes: Normal conjunctiva and lids  Face: symmetric, nerve intact  Nose: The nose is without any evidence of any deformity. The nasal mucosa is moist. The septum is midline. There is no evidence of septal hematoma. The turbinates  are without abnormality.   Ears: The ears are with normal-appearing pinna. Examination of the canals is normal appearing bilaterally. There is no drainage or erythema noted. The tympanic membranes are normal appearing with pearly color, normal-appearing landmarks and normal light reflex. Hearing is grossly intact.  Mouth: No obvious abnormalities to the lips. The teeth are unremarkable. The gingivae are without any obvious evidence of infection or lesion. The oral mucosa is moist and pink. There are no obvious masses to the hard or soft palate.   Oropharynx: The uvula is midline.  The tongue is midline. The posterior pharynx is without erythema or exudate. The tonsils are 1+, tonsilliths and inflamed.   Salivary glands: The salivary glands are symmetric and not enlarged, no masses  Neck: No lymphadenopathy, trachea midline, thryoid not enlarged.  Psych: Normal mood and affect.   Neuro: Grossly intact  Speech: fluent       Assessment and Plan:       1. Tonsillith    2. Chronic tonsillitis    3. Halitosis          She has done peridex intermittently for a long time without persistent improvement in stones. Is embarrassed by breathe and has recurrent sore throats. Denies nasal or reflux issues. I offered tonsillectomy with possible adenoidectomy. She will see what this would cost with insurance and consider recovery timing before contacting to book. I also offered tonsillotomy. She thinks she would do ectomy if needed.     I discussed the risks of tonsillectomy/adenoidectomy, including bleeding, recurrence/persistence of issues (regrowth), need for further procedures, taste changes, injury to mouth/lips, tongue numbness, speech/swallowing changes, VPI.      RTC: PRN    Plan of care was discussed in detail with the patient, who agreed with the plan as above. All questions were answered in detail.     Brittney Arias MD  Otolaryngology

## 2025-03-21 ENCOUNTER — TELEPHONE (OUTPATIENT)
Dept: FAMILY MEDICINE | Facility: CLINIC | Age: 35
End: 2025-03-21
Payer: COMMERCIAL

## 2025-03-21 ENCOUNTER — RESULTS FOLLOW-UP (OUTPATIENT)
Dept: FAMILY MEDICINE | Facility: CLINIC | Age: 35
End: 2025-03-21

## 2025-03-21 DIAGNOSIS — E78.6 LOW HDL (UNDER 40): Primary | ICD-10-CM

## 2025-03-21 DIAGNOSIS — E03.8 SUBCLINICAL HYPOTHYROIDISM: ICD-10-CM

## 2025-03-21 NOTE — TELEPHONE ENCOUNTER
Provider called patient preview lab results. Patient has subclinical hypothyroidism - will continue to monitor with FU in 6mo. HDL was low at 32 - discussed dietary recommendations. All other labs [facility]N. She reported understanding and all questions answered.

## 2025-07-14 ENCOUNTER — OFFICE VISIT (OUTPATIENT)
Dept: FAMILY MEDICINE | Facility: CLINIC | Age: 35
End: 2025-07-14
Payer: COMMERCIAL

## 2025-07-14 ENCOUNTER — TELEPHONE (OUTPATIENT)
Dept: FAMILY MEDICINE | Facility: CLINIC | Age: 35
End: 2025-07-14
Payer: COMMERCIAL

## 2025-07-14 VITALS
WEIGHT: 242.5 LBS | HEIGHT: 69 IN | OXYGEN SATURATION: 97 % | HEART RATE: 80 BPM | DIASTOLIC BLOOD PRESSURE: 66 MMHG | BODY MASS INDEX: 35.92 KG/M2 | SYSTOLIC BLOOD PRESSURE: 126 MMHG

## 2025-07-14 DIAGNOSIS — F43.22 ADJUSTMENT DISORDER WITH ANXIETY: Primary | ICD-10-CM

## 2025-07-14 DIAGNOSIS — Z30.011 INITIATION OF OCP (BCP): ICD-10-CM

## 2025-07-14 PROCEDURE — 99999 PR PBB SHADOW E&M-EST. PATIENT-LVL III: CPT | Mod: PBBFAC,,, | Performed by: INTERNAL MEDICINE

## 2025-07-14 PROCEDURE — 3008F BODY MASS INDEX DOCD: CPT | Mod: CPTII,S$GLB,, | Performed by: INTERNAL MEDICINE

## 2025-07-14 PROCEDURE — 1160F RVW MEDS BY RX/DR IN RCRD: CPT | Mod: CPTII,S$GLB,, | Performed by: INTERNAL MEDICINE

## 2025-07-14 PROCEDURE — 3078F DIAST BP <80 MM HG: CPT | Mod: CPTII,S$GLB,, | Performed by: INTERNAL MEDICINE

## 2025-07-14 PROCEDURE — 3074F SYST BP LT 130 MM HG: CPT | Mod: CPTII,S$GLB,, | Performed by: INTERNAL MEDICINE

## 2025-07-14 PROCEDURE — 99214 OFFICE O/P EST MOD 30 MIN: CPT | Mod: S$GLB,,, | Performed by: INTERNAL MEDICINE

## 2025-07-14 PROCEDURE — 1159F MED LIST DOCD IN RCRD: CPT | Mod: CPTII,S$GLB,, | Performed by: INTERNAL MEDICINE

## 2025-07-14 PROCEDURE — 3044F HG A1C LEVEL LT 7.0%: CPT | Mod: CPTII,S$GLB,, | Performed by: INTERNAL MEDICINE

## 2025-07-14 RX ORDER — HYDROXYZINE HYDROCHLORIDE 25 MG/1
25 TABLET, FILM COATED ORAL 3 TIMES DAILY PRN
Qty: 30 TABLET | Refills: 1 | Status: SHIPPED | OUTPATIENT
Start: 2025-07-14

## 2025-07-14 RX ORDER — BUSPIRONE HYDROCHLORIDE 7.5 MG/1
7.5 TABLET ORAL 2 TIMES DAILY
Qty: 60 TABLET | Refills: 2 | Status: SHIPPED | OUTPATIENT
Start: 2025-07-14 | End: 2026-07-14

## 2025-07-14 RX ORDER — NORETHINDRONE ACETATE AND ETHINYL ESTRADIOL .02; 1 MG/1; MG/1
1 TABLET ORAL DAILY
Qty: 30 TABLET | Refills: 11 | Status: SHIPPED | OUTPATIENT
Start: 2025-07-14 | End: 2026-07-14

## 2025-07-14 NOTE — TELEPHONE ENCOUNTER
Copied from CRM #[id number]. Topic: Appointments - Same Day Access  >> Jul 14, 2025  9:43 AM Ruth Ann wrote:  Type:  Same Day Appointment Request    Caller is requesting a same day appointment.  Caller declined first available appointment listed below.    Name of Caller:Pt   When is the first available appointment?7/28/2025  Symptoms:Anxiety, Mental Health, irregular menstrual   Best Call Back Number:[phone]    Additional Information: Pt stated she is going through life adjustments  and along with a lot of other things. She feels she needs rx for anxiety and is concern with overall mental health. Pt mention that her cycles have been irregular and needs BC. She mention she will be in [place] today considering she has to travel for an appt.   Please call her.

## 2025-07-14 NOTE — PROGRESS NOTES
"  Subjective     Alka Mckeon is a 34 y.o. old, female here for Anxiety (Increased anxiety related to recent life events)    Patient is here with acute complaints of anxiety and stress that started at the end of May when she  from her . She feels generally anxious and feels like she is losing focus and can't complete tasks. It has felt like a "roller coaster" recently. She will be starting a new job soon, which also adds to the stress. She has a counselor, attends [employer], walks regularly, has two kids and does not engage in illicit substance use.    In addition, she would like to be started on OCP's. She previously had IUD's but had complications. We discussed some other options but she would prefer a daily OCP. She has not yet had regular menses but only recently had the IUD taken out.    ROS  Medications     No outpatient medications have been marked as taking for the 7/14/25 encounter (Office Visit) with Jevon Pollard MD.     Objective     /66   Pulse 80   Ht 5' 9" (1.753 m)   Wt 110 kg (242 lb 8.1 oz)   LMP 06/11/2025 (Approximate)   SpO2 97%   BMI 35.81 kg/m²   Physical Exam  Constitutional:       General: She is not in acute distress.     Appearance: Normal appearance. She is well-developed.   Neurological:      Mental Status: She is alert.       Assessment and Plan     Adjustment disorder with anxiety  -     hydrOXYzine HCL (ATARAX) 25 MG tablet; Take 1 tablet (25 mg total) by mouth 3 (three) times daily as needed for Anxiety.  Dispense: 30 tablet; Refill: 1  -     busPIRone (BUSPAR) 7.5 MG tablet; Take 1 tablet (7.5 mg total) by mouth 2 (two) times a day.  Dispense: 60 tablet; Refill: 2    Initiation of OCP (BCP)  -     norethindrone-ethinyl estradiol (MICROGESTIN 1/20) 1-20 mg-mcg per tablet; Take 1 tablet by mouth once daily.  Dispense: 30 tablet; Refill: 11      Encouraged to continue with therapy, good sleep hygiene, healthy diet, continue social engagement, " continue exercise.  Hydroxyzine prn and start Buspar as needed or twice daily.  F/u prn.  ___________________  Jevon Pollard MD  Internal Medicine and Pediatrics

## 2025-08-15 DIAGNOSIS — Z30.011 INITIATION OF OCP (BCP): ICD-10-CM

## 2025-08-15 RX ORDER — NORETHINDRONE ACETATE AND ETHINYL ESTRADIOL .02; 1 MG/1; MG/1
1 TABLET ORAL DAILY
Qty: 30 TABLET | Refills: 11 | OUTPATIENT
Start: 2025-08-15 | End: 2026-08-15